# Patient Record
Sex: MALE | URBAN - METROPOLITAN AREA
[De-identification: names, ages, dates, MRNs, and addresses within clinical notes are randomized per-mention and may not be internally consistent; named-entity substitution may affect disease eponyms.]

---

## 2021-11-02 ENCOUNTER — OFFICE VISIT (OUTPATIENT)
Dept: URBAN - METROPOLITAN AREA CLINIC 56 | Facility: CLINIC | Age: 66
End: 2021-11-02
Payer: COMMERCIAL

## 2021-11-02 DIAGNOSIS — H53.8 OTHER VISUAL DISTURBANCES: Primary | ICD-10-CM

## 2021-11-02 PROCEDURE — 92134 CPTRZ OPH DX IMG PST SGM RTA: CPT | Performed by: OPTOMETRIST

## 2021-11-02 PROCEDURE — 99204 OFFICE O/P NEW MOD 45 MIN: CPT | Performed by: OPTOMETRIST

## 2021-11-02 ASSESSMENT — INTRAOCULAR PRESSURE
OS: 11
OD: 11

## 2021-11-02 ASSESSMENT — VISUAL ACUITY
OD: 20/20
OS: 20/20

## 2021-11-02 ASSESSMENT — KERATOMETRY
OD: 42.62
OS: 42.80

## 2021-11-02 NOTE — IMPRESSION/PLAN
Impression: Other visual disturbances: H53.8. -new onset of geometric shapes left eye lasting 5 minutes. 3 episodes in past week Plan: Patient education this is not ocular related - there is no retinal abnormality. Recommend patient be seen URGENTLY by PCP for evaluation of vascular insufficiency, especially carotid artery on left side. 
If not cardiac related, recommend neurologist.